# Patient Record
Sex: FEMALE | Race: WHITE | NOT HISPANIC OR LATINO | Employment: OTHER | RURAL
[De-identification: names, ages, dates, MRNs, and addresses within clinical notes are randomized per-mention and may not be internally consistent; named-entity substitution may affect disease eponyms.]

---

## 2018-03-19 ENCOUNTER — OFFICE VISIT CONVERTED (OUTPATIENT)
Dept: CARDIOLOGY | Facility: CLINIC | Age: 83
End: 2018-03-19
Attending: SPECIALIST

## 2018-03-19 ENCOUNTER — CONVERSION ENCOUNTER (OUTPATIENT)
Dept: OTHER | Facility: HOSPITAL | Age: 83
End: 2018-03-19

## 2018-03-26 ENCOUNTER — OFFICE VISIT CONVERTED (OUTPATIENT)
Dept: CARDIOLOGY | Facility: CLINIC | Age: 83
End: 2018-03-26
Attending: SPECIALIST

## 2018-08-06 ENCOUNTER — OFFICE VISIT CONVERTED (OUTPATIENT)
Dept: CARDIOLOGY | Facility: CLINIC | Age: 83
End: 2018-08-06
Attending: SPECIALIST

## 2018-12-10 ENCOUNTER — CONVERSION ENCOUNTER (OUTPATIENT)
Dept: OTHER | Facility: HOSPITAL | Age: 83
End: 2018-12-10

## 2018-12-10 ENCOUNTER — OFFICE VISIT CONVERTED (OUTPATIENT)
Dept: CARDIOLOGY | Facility: CLINIC | Age: 83
End: 2018-12-10
Attending: SPECIALIST

## 2019-06-13 ENCOUNTER — OFFICE VISIT CONVERTED (OUTPATIENT)
Dept: CARDIOLOGY | Facility: CLINIC | Age: 84
End: 2019-06-13
Attending: SPECIALIST

## 2019-12-12 ENCOUNTER — OFFICE VISIT CONVERTED (OUTPATIENT)
Dept: CARDIOLOGY | Facility: CLINIC | Age: 84
End: 2019-12-12
Attending: SPECIALIST

## 2020-04-13 ENCOUNTER — TELEPHONE CONVERTED (OUTPATIENT)
Dept: CARDIOLOGY | Facility: CLINIC | Age: 85
End: 2020-04-13
Attending: SPECIALIST

## 2020-12-07 ENCOUNTER — OFFICE VISIT CONVERTED (OUTPATIENT)
Dept: CARDIOLOGY | Facility: CLINIC | Age: 85
End: 2020-12-07
Attending: SPECIALIST

## 2021-05-12 NOTE — PROGRESS NOTES
Quick Note      Patient Name: Gisella Hudson   Patient ID: [id number]   Sex: Female   YOB: 1934    Primary Care Provider: Gege BONE   Referring Provider: Gege BONE    Visit Date: April 13, 2020    Provider: Suhas Palmer MD   Location: [facility]   Location Address: [address] [place] [address]  [id number]   Location Phone: [phone]          History Of Present Illness  TELEHEALTH TELEPHONE VISIT  Chief Complaint: Palpitations; Valvular heart disease   Gisella Hudson is a 85 year old /White female with history of palpitations and valvular heart disease who is presenting for evaluation via telehealth telephone visit. Verbal consent obtained before beginning visit. Denies any chest pain. No shortness of breath. Blood pressure under control.   Provider spent 6 minutes with the patient during telehealth phone visit.   The patients  was present during this visit.   Past Medical History/Overview of Patient Symptoms     Positive for hypertension and valvular heart disease. Negative for diabetes.    FAMILY HISTORY: Negative for diabetes and heart disease. Positive for hypertension.    MEDICATIONS: Metoprolol ER 25 mg qd; Lisinopril 5 mg qd; Lorazepam 1 mg tid; ASA 81 mg qd; Pepcid qd; Norco 7.5/325 mg prn.    VITALS as per the patient. /90. HRT 80  . HT 5'5.     IMPRESSION/PLAN    1. Palpitations stable. Continue current dose of Metoprolol.   2. Nonrheumatic mitral valve insufficiency moderately severe stable. Continue current dose of Lisinopril.  3. See me back in six months.    Suhas Palmer MD  MANASA/wt                  Plan  · Medications  o Medications have been Reconciled  o Transition of Care or Provider Policy  · Instructions  o Plan Of Care:             Electronically Signed by: Kenisha Saini-, -Author on April 20, 2020 09:33:47 AM  Electronically Co-signed by: Suhas Palmer MD -Reviewer on  April 23, 2020 10:16:49 AM

## 2021-05-13 NOTE — PROGRESS NOTES
"   Progress Note      Patient Name: Gisella Hudson   Patient ID: [id number]   Sex: Female   YOB: 1934    Primary Care Provider: Gege BONE   Referring Provider: Gege BONE    Visit Date: December 7, 2020    Provider: Suhas Palmer MD   Location: [facility]   Location Address: [address] [place] [address]  [id number]   Location Phone: [phone]          Chief Complaint  · Palpitations  · Valvular heart disease      History Of Present Illness  Gisella Hudson is an 86 year old /White female with a history of palpitations, valvular heart disease. Patient denies chest pain. She has some shortness of breath. No PND or orthopnea.   Medications  Meds at present include: Metoprolol ER 25 mg qd; Lisinopril 5 mg qd; Lorazepam 1 mg tid; ASA 81 mg qhs; Norco 7.5/3.25 mg q 8 hrs prn.   PAST MEDICAL HISTORY: negative for diabetes; positive for hypertension and palpitations.   PSYCHO/SOCIAL HISTORY: does not drink alcohol and does not smoke.       Review of Systems  · Cardiovascular  o Admits  o : shortness of breath   o Denies  o : swelling (feet, ankles, hands), chest pain or angina pectoris, palpitations  · Respiratory  o Denies  o : chronic or frequent cough, asthma or wheezing      Vitals  Date Time BP Position Site L\R Cuff Size HR RR TEMP (F) WT  HT  BMI kg/m2 BSA m2 O2 Sat FR L/min FiO2 HC       12/07/2020 11:25 /75 Sitting    77 - R   97lbs 16oz 5'  5\" 16.31 1.43       12/07/2020 11:25 /74 Sitting    72 - R                   Physical Examination  · Constitutional  o Appearance  o : Alert and Oriented X3. The patient is well built and well nourished.  · Respiratory  o Inspection of Chest  o : No chest wall deformities, moving equal  o Auscultation of Lungs  o : Good air entry with vesicular breath sounds  · Cardiovascular  o Heart  o :   § Auscultation of Heart  § : S1 and S2 are regular. No S3. No S4. There is a systolic " murmur grade 3/6 in the mitral area.   o Peripheral Vascular System  o :   § Extremities  § : Peripheral pulses were well felt. No edema. No cyanosis.  · Gastrointestinal  o Abdominal Examination  o : No masses or tenderness noted.           Assessment     IMPRESSION/PLAN    1. Nonrheumatic mitral valve insufficiency moderately severe, stable. Continue current dose of Lisinopril.  Will repeat an echocardiogram on her next visit.   2. Palpitations stable. Continue current dose of Toprol XL 25 mg qd.  3. See me back in 6 months.      MD MANASA Holden/wt       Plan  · Instructions  o This note was transcribed by Elena Saini. MANASA/wt  o The above service was transcribed by Elena Saini on my behalf and I attest to the accuracy of the note. MANASA            Electronically Signed by: Kenisha Saini-, -Author on December 8, 2020 08:35:37 AM  Electronically Co-signed by: Suhas Palmer MD -Reviewer on December 16, 2020 10:48:47 AM

## 2021-05-14 VITALS
WEIGHT: 98 LBS | BODY MASS INDEX: 16.33 KG/M2 | SYSTOLIC BLOOD PRESSURE: 152 MMHG | DIASTOLIC BLOOD PRESSURE: 75 MMHG | HEART RATE: 77 BPM | HEIGHT: 65 IN

## 2021-05-15 VITALS
BODY MASS INDEX: 14.75 KG/M2 | WEIGHT: 94 LBS | SYSTOLIC BLOOD PRESSURE: 135 MMHG | DIASTOLIC BLOOD PRESSURE: 62 MMHG | HEIGHT: 67 IN | HEART RATE: 68 BPM

## 2021-05-15 VITALS
SYSTOLIC BLOOD PRESSURE: 135 MMHG | WEIGHT: 101 LBS | BODY MASS INDEX: 16.83 KG/M2 | DIASTOLIC BLOOD PRESSURE: 54 MMHG | HEART RATE: 61 BPM | HEIGHT: 65 IN

## 2021-05-15 VITALS
HEIGHT: 67 IN | DIASTOLIC BLOOD PRESSURE: 53 MMHG | SYSTOLIC BLOOD PRESSURE: 104 MMHG | BODY MASS INDEX: 15.22 KG/M2 | HEART RATE: 71 BPM | WEIGHT: 97 LBS

## 2021-05-16 VITALS
DIASTOLIC BLOOD PRESSURE: 63 MMHG | WEIGHT: 106.25 LBS | BODY MASS INDEX: 16.67 KG/M2 | SYSTOLIC BLOOD PRESSURE: 130 MMHG | HEIGHT: 67 IN | HEART RATE: 89 BPM

## 2021-05-16 VITALS — HEIGHT: 67 IN | HEART RATE: 88 BPM | DIASTOLIC BLOOD PRESSURE: 66 MMHG | SYSTOLIC BLOOD PRESSURE: 119 MMHG

## 2021-05-16 VITALS
DIASTOLIC BLOOD PRESSURE: 71 MMHG | HEIGHT: 67 IN | WEIGHT: 100 LBS | SYSTOLIC BLOOD PRESSURE: 133 MMHG | BODY MASS INDEX: 15.7 KG/M2 | HEART RATE: 70 BPM

## 2021-05-24 ENCOUNTER — CONVERSION ENCOUNTER (OUTPATIENT)
Dept: CARDIOLOGY | Facility: CLINIC | Age: 86
End: 2021-05-24
Attending: SPECIALIST

## 2021-06-05 NOTE — PROCEDURES
Procedure Note      Patient Name: Gisella Hudson   Patient ID: [id number]   Sex: Female   YOB: 1934    Primary Care Provider: Gege BONE   Referring Provider: Gege BONE    Visit Date: May 24, 2021    Provider: Suhas Palmer MD   Location: [facility]   Location Address: [address] [place] [address]  [id number]   Location Phone: [phone]          FINAL REPORT   TRANSTHORACIC ECHOCARDIOGRAM REPORT    Diagnosis: MR   Height: 5'5 Weight: 98 B/P: 141/74 BSA: 1.47   Tech: JLW   MEASUREMENTS:  RVID (Diastole) : RVID. (NORMAL: 0.7 to 2.4 cm max)   LVID (Systole): 2.9 cm (Diastole): 4.4 cm . (NORMAL: 3.7 - 5.4 cm)   Posterior Wall Thickness (Diastole): 0.9 cm. (NORMAL: 0.8 - 1.1 cm)   Septal Thickness (Diastole): 1 cm. (NORMAL: 0.7 - 1.2 cm)   LAID (Systole): 2.6 cm. (NORMAL: 1.9 - 3.8 cm)   Aortic Root Diameter (Diastole): 2.9 cm. (NORMAL: 2.0 - 3.7 cm)   DOPPLER:  E/A ratio 0.8 (NORMAL 0.8-2.0)   DT: 225 msec (NORMAL 140-240 msec.)   IVRT 135 m/sec (NORMAL  m/sec.)   E/E': 10 (NORMAL <8 avg.)   COMMENTS:  The patient underwent 2-D, M-Mode, and Doppler examination, including pulse-wave, continuous-wave, and color-flow analysis; the study is technically adequate.   FINDINGS:  AORTIC VALVE: Fibrotic.   MITRAL VALVE: Fibrotic.   TRICUSPID VALVE: Normal.   PULMONIC VALVE: Not well visualized.   LEFT ATRIUM: Normal. No intracavitary masses or clots seen. LA volume index is 26 mL/m2.   AORTIC ROOT: Normal in size with adequate motion.   LEFT VENTRICLE: Normal left ventricular systolic function. Ejection fraction 60%.   RIGHT ATRIUM: Normal.   RIGHT VENTRICLE: Normal size and function.   PERICARDIUM: Unremarkable. No evidence of effusion.   INFERIOR VENA CAVA: Diameter is 1.3 cm.   DOPPLER: Doppler examination of the aortic, mitral, tricuspid, and pulmonary valves was performed. Normal pulmonary artery systolic pressure by Doppler. Shows  mild to moderate mitral regurgitation, trace aortic regurgitation, mild to moderate tricuspid regurgitation.      CONCLUSION    1. Fibrotic mitral and aortic valves.   2. Normal left ventricle systolic function.    3. Mild to moderate mitral regurgitation.  4. Trace aortic regurgitation.  5. Mild to moderate tricuspid regurgitation.      Suhas Palmer MD  MANASA/wt                 Electronically Signed by: Kenisha Saini-, -Author on May 26, 2021 11:29:20 AM  Electronically Co-signed by: Suhas Palmer MD -Reviewer on June 2, 2021 12:42:07 PM

## 2021-07-20 RX ORDER — METOPROLOL SUCCINATE 25 MG/1
TABLET, EXTENDED RELEASE ORAL
Qty: 30 TABLET | Refills: 6 | OUTPATIENT
Start: 2021-07-20

## 2021-08-02 RX ORDER — METOPROLOL SUCCINATE 25 MG/1
TABLET, EXTENDED RELEASE ORAL
Qty: 30 TABLET | Refills: 4 | Status: SHIPPED | OUTPATIENT
Start: 2021-08-02 | End: 2022-12-12

## 2022-06-20 ENCOUNTER — OFFICE VISIT (OUTPATIENT)
Dept: CARDIOLOGY | Facility: CLINIC | Age: 87
End: 2022-06-20

## 2022-06-20 VITALS
HEIGHT: 66 IN | BODY MASS INDEX: 15.11 KG/M2 | SYSTOLIC BLOOD PRESSURE: 154 MMHG | WEIGHT: 94 LBS | DIASTOLIC BLOOD PRESSURE: 56 MMHG | HEART RATE: 76 BPM

## 2022-06-20 DIAGNOSIS — R00.2 PALPITATIONS: Primary | ICD-10-CM

## 2022-06-20 DIAGNOSIS — I34.0 NONRHEUMATIC MITRAL VALVE REGURGITATION: ICD-10-CM

## 2022-06-20 DIAGNOSIS — I10 HYPERTENSION, ESSENTIAL: ICD-10-CM

## 2022-06-20 PROCEDURE — 99214 OFFICE O/P EST MOD 30 MIN: CPT | Performed by: SPECIALIST

## 2022-06-20 RX ORDER — OMEPRAZOLE 20 MG/1
CAPSULE, DELAYED RELEASE ORAL
COMMUNITY
Start: 2022-06-17

## 2022-06-20 RX ORDER — LISINOPRIL 10 MG/1
10 TABLET ORAL DAILY
COMMUNITY
Start: 2022-06-08 | End: 2022-06-20 | Stop reason: SDUPTHER

## 2022-06-20 RX ORDER — LORAZEPAM 1 MG/1
1 TABLET ORAL 3 TIMES DAILY
COMMUNITY
Start: 2022-06-10

## 2022-06-20 RX ORDER — METOPROLOL SUCCINATE 25 MG/1
25 TABLET, EXTENDED RELEASE ORAL DAILY
COMMUNITY
Start: 2020-12-21

## 2022-06-20 RX ORDER — LISINOPRIL 20 MG/1
10 TABLET ORAL DAILY
Qty: 90 TABLET | Refills: 2 | Status: SHIPPED | OUTPATIENT
Start: 2022-06-20 | End: 2022-12-12 | Stop reason: SDUPTHER

## 2022-06-20 RX ORDER — ASPIRIN 81 MG/1
81 TABLET ORAL DAILY
COMMUNITY

## 2022-06-20 RX ORDER — HYDROCODONE BITARTRATE AND ACETAMINOPHEN 7.5; 325 MG/1; MG/1
1 TABLET ORAL 3 TIMES DAILY
COMMUNITY
Start: 2022-06-10

## 2022-06-20 NOTE — PROGRESS NOTES
[facility]  Cardiology progress Note    Patient Name: Gisella Hudson  : 1934    CHIEF COMPLAINT  Hypertension, valvular heart disease.      Subjective   Subjective     HISTORY OF PRESENT ILLNESS    Gisella Hudson is a 87 y.o. female with history of hypertension Oropilla heart disease.  No chest pain or shortness of breath.  No palpitations.    Review of Systems:   Constitutional no fever,  no weight loss   Skin no rash   Otolaryngeal no difficulty swallowing   Cardiovascular See HPI   Pulmonary no cough, no sputum production   Gastrointestinal no constipation, no diarrhea   Genitourinary no dysuria, no hematuria   Hematologic no easy bruisability, no abnormal bleeding   Musculoskeletal no muscle pain   Neurologic no dizziness, no falls         Personal History     Social History:  reports that she has quit smoking. Her smokeless tobacco use includes snuff. She reports that she does not drink alcohol and does not use drugs.    Home Medications:  Current Outpatient Medications on File Prior to Visit   Medication Sig   • aspirin 81 MG EC tablet Take 81 mg by mouth Daily.   • HYDROcodone-acetaminophen (NORCO) 7.5-325 MG per tablet Take 1 tablet by mouth 3 (Three) Times a Day.   • lisinopril (PRINIVIL,ZESTRIL) 10 MG tablet Take 10 mg by mouth Daily.   • LORazepam (ATIVAN) 1 MG tablet Take 1 mg by mouth 3 (Three) Times a Day.   • metoprolol succinate XL (TOPROL-XL) 25 MG 24 hr tablet Take 25 mg by mouth Daily.   • omeprazole (priLOSEC) 20 MG capsule      No current facility-administered medications on file prior to visit.     Allergies:  Allergies   Allergen Reactions   • Bactrim [Sulfamethoxazole-Trimethoprim] Hives       Objective    Objective       Vitals:   Heart Rate:  [75-76] 76  BP: (154-162)/(56-57) 154/56  Body mass index is 15.17 kg/m².     Physical Exam:   Constitutional: Awake, alert, No acute distress    Eyes: PERRLA, sclerae anicteric, no conjunctival injection   HENT: NCAT, mucous  membranes moist   Neck: Supple, no thyromegaly, no lymphadenopathy, trachea midline   Respiratory: Clear to auscultation bilaterally, nonlabored respirations    Cardiovascular: RRR, no murmurs or rubs. Palpable pedal pulses bilaterally   Musculoskeletal: No bilateral ankle edema, no cyanosis to extremities   Psychiatric: Appropriate affect, cooperative   Neurologic: Oriented x 3, strength symmetric in all extremities, Cranial Nerves grossly intact to confrontation, speech clear   Skin: No rashes.    Result Review    Result Review:  I have personally reviewed the available results from  [x]  Laboratory  [x]  EKG  [x]  Cardiology  [x]  Medications  [x]  Old records  []  Other:   Procedures    Impression/Plan:  1.  Essential hypertension Uncontrolled: Continue lisinopril, increase the dose to 20 mg once a day.  Monitor blood pressure regularly.  2.  Stable palpitations: Continue Toprol-XL 25 mg a day.  3.  Nonrheumatic mitral insufficiency: Repeat echocardiogram next visit.        Suhas Palmer MD   06/20/22   14:08 EDT

## 2022-11-02 ENCOUNTER — TELEPHONE (OUTPATIENT)
Dept: CARDIOLOGY | Facility: CLINIC | Age: 87
End: 2022-11-02

## 2022-11-02 NOTE — TELEPHONE ENCOUNTER
----- Message from Sarina Garces sent at 11/1/2022  2:46 PM EDT -----    ----- Message -----  From: Ml Wen APRN  Sent: 10/28/2022   2:49 PM EDT  To: Sarina Garces    Notify pt echo result: EF 60% and mild mitral valve prolapse (murmur), will monitor with repeat echo in 1-2 years. Follow up as scheduled

## 2022-12-09 NOTE — PROGRESS NOTES
[facility]  Cardiology progress Note    Patient Name: Gisella Hudson  : 1933    CHIEF COMPLAINT  Hypertension        Subjective   Subjective     HISTORY OF PRESENT ILLNESS    Gisella Hudson is a 89 y.o. female with history of hypertension and palpitations.  No chest pain or shortness of breath.    REVIEW OF SYSTEMS    Constitutional:    No fever, no weight loss  Skin:     No rash  Otolaryngeal:    No difficulty swallowing  Cardiovascular: See HPI.  Pulmonary:    No cough, no sputum production    Personal History     Social History:    reports that she has quit smoking. Her smokeless tobacco use includes snuff. She reports that she does not drink alcohol and does not use drugs.    Home Medications:  Current Outpatient Medications on File Prior to Visit   Medication Sig   • aspirin 81 MG EC tablet Take 81 mg by mouth Daily.   • HYDROcodone-acetaminophen (NORCO) 7.5-325 MG per tablet Take 1 tablet by mouth 3 (Three) Times a Day.   • LORazepam (ATIVAN) 1 MG tablet Take 1 mg by mouth 3 (Three) Times a Day.   • metoprolol succinate XL (TOPROL-XL) 25 MG 24 hr tablet Take 25 mg by mouth Daily.   • omeprazole (priLOSEC) 20 MG capsule    • [DISCONTINUED] lisinopril (PRINIVIL,ZESTRIL) 20 MG tablet Take 0.5 tablets by mouth Daily.   • [DISCONTINUED] metoprolol succinate XL (TOPROL-XL) 25 MG 24 hr tablet TAKE ONE TABLET BY MOUTH EVERY DAY     No current facility-administered medications on file prior to visit.       Past Medical History:   Diagnosis Date   • Hypertension        Allergies:  Allergies   Allergen Reactions   • Bactrim [Sulfamethoxazole-Trimethoprim] Hives       Objective    Objective       Vitals:   Heart Rate:  [68-70] 68  BP: (156-170)/(70) 156/70  Body mass index is 15.49 kg/m².     PHYSICAL EXAM:    General Appearance:   · well developed  · well nourished  HENT:   · oropharynx moist  · lips not cyanotic  Neck:  · thyroid not enlarged  · supple  Respiratory:  · no respiratory  distress  · normal breath sounds  · no rales  Cardiovascular:  · no jugular venous distention  · regular rhythm  · apical impulse normal  · S1 normal, S2 normal  · no S3, no S4   · no murmur  · no rub, no thrill  · carotid pulses normal; no bruit  · pedal pulses normal  · lower extremity edema: none    Skin:   · warm, dry  Psychiatric:  · judgement and insight appropriate  · normal mood and affect        Result Review:  I have personally reviewed the available results from  [x]  Laboratory  [x]  EKG  [x]  Cardiology  [x]  Medications  [x]  Old records  []  Other:     Procedures  Results for orders placed in visit on 10/24/22    Adult Transthoracic Echo Complete W/ Cont if Necessary Per Protocol    Interpretation Summary  •  Calculated left ventricular EF = 60%  •  There is mild bileaflet mitral valve prolapse present.  •  Estimated right ventricular systolic pressure from tricuspid regurgitation is mildly elevated (35-45 mmHg).  •  Left atrial dilation moderate     Impression/Plan:  1.  Stable palpitations: Continue Toprol-XL 25 mg a day.  2.  Essential hypertension controlled: Continue lisinopril 20 mg a day.  Monitor blood pressure regularly.  Blood pressure controlled at home.  3.  Mild mitral valve prolapse: Asymptomatic.           Suhas Palmer MD   12/12/22   13:36 EST

## 2022-12-12 ENCOUNTER — OFFICE VISIT (OUTPATIENT)
Dept: CARDIOLOGY | Facility: CLINIC | Age: 87
End: 2022-12-12

## 2022-12-12 VITALS
WEIGHT: 96 LBS | HEIGHT: 66 IN | SYSTOLIC BLOOD PRESSURE: 156 MMHG | HEART RATE: 68 BPM | BODY MASS INDEX: 15.43 KG/M2 | DIASTOLIC BLOOD PRESSURE: 70 MMHG

## 2022-12-12 DIAGNOSIS — I10 HYPERTENSION, ESSENTIAL: Primary | ICD-10-CM

## 2022-12-12 DIAGNOSIS — R00.2 PALPITATIONS: ICD-10-CM

## 2022-12-12 PROCEDURE — 99214 OFFICE O/P EST MOD 30 MIN: CPT | Performed by: SPECIALIST

## 2022-12-12 RX ORDER — LISINOPRIL 20 MG/1
20 TABLET ORAL DAILY
Qty: 90 TABLET | Refills: 2 | Status: SHIPPED | OUTPATIENT
Start: 2022-12-12

## 2023-06-08 NOTE — PROGRESS NOTES
[facility]  Cardiology progress Note    Patient Name: Gisella Hudson  : 1933    CHIEF COMPLAINT   Hypertension        Subjective   Subjective     HISTORY OF PRESENT ILLNESS    Gisella Hudson is a 89 y.o. female history of hypertension.  No chest pain.  No palpitations.    REVIEW OF SYSTEMS    Constitutional:    No fever, no weight loss  Skin:     No rash  Otolaryngeal:    No difficulty swallowing  Cardiovascular: See HPI.  Pulmonary:    No cough, no sputum production    Personal History     Social History:    reports that she has quit smoking. Her smokeless tobacco use includes snuff. She reports that she does not drink alcohol and does not use drugs.    Home Medications:  Current Outpatient Medications on File Prior to Visit   Medication Sig    aspirin 81 MG EC tablet Take 1 tablet by mouth Daily.    HYDROcodone-acetaminophen (NORCO) 7.5-325 MG per tablet Take 1 tablet by mouth 3 (Three) Times a Day.    lisinopril (PRINIVIL,ZESTRIL) 20 MG tablet Take 1 tablet by mouth Daily.    LORazepam (ATIVAN) 1 MG tablet Take 1 tablet by mouth 3 (Three) Times a Day.    metoprolol succinate XL (TOPROL-XL) 25 MG 24 hr tablet Take 1 tablet by mouth Daily.    omeprazole (priLOSEC) 20 MG capsule      No current facility-administered medications on file prior to visit.       Past Medical History:   Diagnosis Date    Hypertension        Allergies:  Allergies   Allergen Reactions    Bactrim [Sulfamethoxazole-Trimethoprim] Hives       Objective    Objective       Vitals:   Heart Rate:  [72] 72  BP: (146)/(62) 146/62  Body mass index is 15.98 kg/m².     PHYSICAL EXAM:    General Appearance:   well developed  well nourished  HENT:   oropharynx moist  lips not cyanotic  Neck:  thyroid not enlarged  supple  Respiratory:  no respiratory distress  normal breath sounds  no rales  Cardiovascular:  no jugular venous distention  regular rhythm  apical impulse normal  S1 normal, S2 normal  no S3, no S4   no murmur  no rub, no  thrill  carotid pulses normal; no bruit  pedal pulses normal  lower extremity edema: none    Skin:   warm, dry  Psychiatric:  judgement and insight appropriate  normal mood and affect        Result Review:  I have personally reviewed the available results from  [x]  Laboratory  [x]  EKG  [x]  Cardiology  [x]  Medications  [x]  Old records  []  Other:     Procedures    Results for orders placed in visit on 10/24/22    Adult Transthoracic Echo Complete W/ Cont if Necessary Per Protocol    Interpretation Summary    Calculated left ventricular EF = 60%    There is mild bileaflet mitral valve prolapse present.    Estimated right ventricular systolic pressure from tricuspid regurgitation is mildly elevated (35-45 mmHg).    Left atrial dilation moderate     Impression/Plan:  1.  Essential hypertension controlled: Continue lisinopril 20 mg once a day.  Monitor blood pressure regularly.  2.  Stable palpitations: Continue Toprol-XL 25 mg once a day.  No palpitations.  3.  Mild mitral prolapse: Asymptomatic.           Suhas Palmer MD   06/12/23   13:51 EDT

## 2023-06-12 ENCOUNTER — OFFICE VISIT (OUTPATIENT)
Dept: CARDIOLOGY | Facility: CLINIC | Age: 88
End: 2023-06-12
Payer: MEDICARE

## 2023-06-12 VITALS
HEIGHT: 66 IN | DIASTOLIC BLOOD PRESSURE: 62 MMHG | BODY MASS INDEX: 15.91 KG/M2 | SYSTOLIC BLOOD PRESSURE: 146 MMHG | HEART RATE: 72 BPM | WEIGHT: 99 LBS

## 2023-06-12 DIAGNOSIS — R00.2 PALPITATIONS: ICD-10-CM

## 2023-06-12 DIAGNOSIS — I10 HYPERTENSION, ESSENTIAL: Primary | ICD-10-CM

## 2023-06-12 PROCEDURE — 1159F MED LIST DOCD IN RCRD: CPT | Performed by: SPECIALIST

## 2023-06-12 PROCEDURE — 99214 OFFICE O/P EST MOD 30 MIN: CPT | Performed by: SPECIALIST

## 2023-06-12 PROCEDURE — 1160F RVW MEDS BY RX/DR IN RCRD: CPT | Performed by: SPECIALIST

## 2024-02-12 ENCOUNTER — OFFICE VISIT (OUTPATIENT)
Dept: CARDIOLOGY | Facility: CLINIC | Age: 89
End: 2024-02-12
Payer: MEDICARE

## 2024-02-12 VITALS
DIASTOLIC BLOOD PRESSURE: 66 MMHG | BODY MASS INDEX: 14.68 KG/M2 | SYSTOLIC BLOOD PRESSURE: 133 MMHG | HEART RATE: 79 BPM | WEIGHT: 86 LBS | HEIGHT: 64 IN

## 2024-02-12 DIAGNOSIS — I10 HYPERTENSION, ESSENTIAL: Primary | ICD-10-CM

## 2024-02-12 DIAGNOSIS — R00.2 PALPITATIONS: ICD-10-CM

## 2024-02-12 PROCEDURE — 99214 OFFICE O/P EST MOD 30 MIN: CPT | Performed by: SPECIALIST

## 2024-02-12 PROCEDURE — 1159F MED LIST DOCD IN RCRD: CPT | Performed by: SPECIALIST

## 2024-02-12 PROCEDURE — 1160F RVW MEDS BY RX/DR IN RCRD: CPT | Performed by: SPECIALIST
